# Patient Record
Sex: MALE | Race: WHITE
[De-identification: names, ages, dates, MRNs, and addresses within clinical notes are randomized per-mention and may not be internally consistent; named-entity substitution may affect disease eponyms.]

---

## 2018-03-09 ENCOUNTER — HOSPITAL ENCOUNTER (OUTPATIENT)
Dept: HOSPITAL 92 - BICULT | Age: 58
Discharge: HOME | End: 2018-03-09
Attending: FAMILY MEDICINE
Payer: COMMERCIAL

## 2018-03-09 DIAGNOSIS — N26.1: ICD-10-CM

## 2018-03-09 DIAGNOSIS — N28.1: ICD-10-CM

## 2018-03-09 DIAGNOSIS — R31.29: Primary | ICD-10-CM

## 2018-03-09 PROCEDURE — 76770 US EXAM ABDO BACK WALL COMP: CPT

## 2018-12-06 ENCOUNTER — HOSPITAL ENCOUNTER (INPATIENT)
Dept: HOSPITAL 92 - SJJU | Age: 58
LOS: 15 days | Discharge: HOME | DRG: 467 | End: 2018-12-21
Attending: ORTHOPAEDIC SURGERY | Admitting: ORTHOPAEDIC SURGERY
Payer: COMMERCIAL

## 2018-12-06 ENCOUNTER — HOSPITAL ENCOUNTER (OUTPATIENT)
Dept: HOSPITAL 92 - LABBT | Age: 58
Discharge: HOME | End: 2018-12-06
Attending: ORTHOPAEDIC SURGERY
Payer: COMMERCIAL

## 2018-12-06 VITALS — BODY MASS INDEX: 26.4 KG/M2

## 2018-12-06 DIAGNOSIS — E78.5: ICD-10-CM

## 2018-12-06 DIAGNOSIS — T84.011A: Primary | ICD-10-CM

## 2018-12-06 DIAGNOSIS — Z88.6: ICD-10-CM

## 2018-12-06 DIAGNOSIS — Z01.818: Primary | ICD-10-CM

## 2018-12-06 DIAGNOSIS — Z88.0: ICD-10-CM

## 2018-12-06 DIAGNOSIS — T84.018A: ICD-10-CM

## 2018-12-06 DIAGNOSIS — D62: ICD-10-CM

## 2018-12-06 DIAGNOSIS — F17.210: ICD-10-CM

## 2018-12-06 DIAGNOSIS — I10: ICD-10-CM

## 2018-12-06 DIAGNOSIS — K21.0: ICD-10-CM

## 2018-12-06 DIAGNOSIS — Z96.643: ICD-10-CM

## 2018-12-06 DIAGNOSIS — D64.9: ICD-10-CM

## 2018-12-06 DIAGNOSIS — M17.12: ICD-10-CM

## 2018-12-06 DIAGNOSIS — Z79.82: ICD-10-CM

## 2018-12-06 DIAGNOSIS — R50.82: ICD-10-CM

## 2018-12-06 LAB
ANION GAP SERPL CALC-SCNC: 11 MMOL/L (ref 10–20)
BASOPHILS # BLD AUTO: 0.1 THOU/UL (ref 0–0.2)
BASOPHILS NFR BLD AUTO: 1.1 % (ref 0–1)
BUN SERPL-MCNC: 24 MG/DL (ref 8.4–25.7)
CALCIUM SERPL-MCNC: 9.9 MG/DL (ref 7.8–10.44)
CHLORIDE SERPL-SCNC: 108 MMOL/L (ref 98–107)
CO2 SERPL-SCNC: 26 MMOL/L (ref 22–29)
CREAT CL PREDICTED SERPL C-G-VRATE: 0 ML/MIN (ref 70–130)
CRYSTAL-AUWI FLAG: 0 (ref 0–15)
EOSINOPHIL # BLD AUTO: 0.4 THOU/UL (ref 0–0.7)
EOSINOPHIL NFR BLD AUTO: 7.2 % (ref 0–10)
GLUCOSE SERPL-MCNC: 107 MG/DL (ref 70–105)
HEV IGM SER QL: 0.8 (ref 0–7.99)
HGB BLD-MCNC: 12.7 G/DL (ref 14–18)
HYALINE CASTS #/AREA URNS LPF: (no result) LPF
INR PPP: 1
LYMPHOCYTES # BLD: 1.6 THOU/UL (ref 1.2–3.4)
LYMPHOCYTES NFR BLD AUTO: 28.8 % (ref 21–51)
MCH RBC QN AUTO: 29.4 PG (ref 27–31)
MCV RBC AUTO: 88.1 FL (ref 78–98)
MONOCYTES # BLD AUTO: 0.6 THOU/UL (ref 0.11–0.59)
MONOCYTES NFR BLD AUTO: 9.7 % (ref 0–10)
NEUTROPHILS # BLD AUTO: 3 THOU/UL (ref 1.4–6.5)
NEUTROPHILS NFR BLD AUTO: 53.2 % (ref 42–75)
PATHC CAST-AUWI FLAG: 0.14 (ref 0–2.49)
PLATELET # BLD AUTO: 288 THOU/UL (ref 130–400)
POTASSIUM SERPL-SCNC: 3.8 MMOL/L (ref 3.5–5.1)
PROTHROMBIN TIME: 12.9 SEC (ref 12–14.7)
RBC # BLD AUTO: 4.32 MILL/UL (ref 4.7–6.1)
RBC UR QL AUTO: (no result) HPF (ref 0–3)
SODIUM SERPL-SCNC: 141 MMOL/L (ref 136–145)
SP GR UR STRIP: 1.02 (ref 1–1.04)
SPERM-AUWI FLAG: 0 (ref 0–9.9)
WBC # BLD AUTO: 5.7 THOU/UL (ref 4.8–10.8)
WBC UR QL AUTO: (no result) HPF (ref 0–3)
YEAST-AUWI FLAG: 0 (ref 0–25)

## 2018-12-06 PROCEDURE — 85610 PROTHROMBIN TIME: CPT

## 2018-12-06 PROCEDURE — C1776 JOINT DEVICE (IMPLANTABLE): HCPCS

## 2018-12-06 PROCEDURE — 81001 URINALYSIS AUTO W/SCOPE: CPT

## 2018-12-06 PROCEDURE — 85027 COMPLETE CBC AUTOMATED: CPT

## 2018-12-06 PROCEDURE — 93005 ELECTROCARDIOGRAM TRACING: CPT

## 2018-12-06 PROCEDURE — 87081 CULTURE SCREEN ONLY: CPT

## 2018-12-06 PROCEDURE — 85025 COMPLETE CBC W/AUTO DIFF WBC: CPT

## 2018-12-06 PROCEDURE — 80048 BASIC METABOLIC PNL TOTAL CA: CPT

## 2018-12-06 PROCEDURE — 36415 COLL VENOUS BLD VENIPUNCTURE: CPT

## 2018-12-06 PROCEDURE — 93010 ELECTROCARDIOGRAM REPORT: CPT

## 2018-12-07 NOTE — EKG
Test Reason : 

Blood Pressure : ***/*** mmHG

Vent. Rate : 062 BPM     Atrial Rate : 062 BPM

   P-R Int : 238 ms          QRS Dur : 094 ms

    QT Int : 422 ms       P-R-T Axes : 032 -10 022 degrees

   QTc Int : 428 ms

 

Sinus rhythm with 1st degree A-V block with occasional Premature ventricular complexes and Possible P
remature atrial complexes with Abberant

conduction

Minimal voltage criteria for LVH, may be normal variant

Borderline ECG

When compared with ECG of 11-OCT-2016 14:15,

Premature ventricular complexes are now Present

Abberant conduction is now Present

Confirmed by EMMA HENDERSON ([id number]) on 12/7/2018 6:50:02 PM

 

Referred By:  JOSE C           Confirmed By:EMMA HENDERSON

## 2018-12-14 ENCOUNTER — HOSPITAL ENCOUNTER (OUTPATIENT)
Dept: HOSPITAL 92 - LABBT | Age: 58
Discharge: HOME | End: 2018-12-14
Attending: ORTHOPAEDIC SURGERY
Payer: COMMERCIAL

## 2018-12-14 DIAGNOSIS — Z01.818: Primary | ICD-10-CM

## 2018-12-14 DIAGNOSIS — I70.0: ICD-10-CM

## 2018-12-14 DIAGNOSIS — T84.018A: ICD-10-CM

## 2018-12-14 PROCEDURE — 71046 X-RAY EXAM CHEST 2 VIEWS: CPT

## 2018-12-14 PROCEDURE — 86901 BLOOD TYPING SEROLOGIC RH(D): CPT

## 2018-12-14 PROCEDURE — 86900 BLOOD TYPING SEROLOGIC ABO: CPT

## 2018-12-14 PROCEDURE — 86850 RBC ANTIBODY SCREEN: CPT

## 2018-12-14 NOTE — RAD
CHEST PA AND LATERAL:

 

History: 

58-year-old male for preoperative evaluation. 

 

Comparison: 

2-19-18

 

FINDINGS: 

Heart size is within normal limits. No confluent pneumonia, overt edema, or pleural effusion. 

 

IMPRESSION: 

No acute intrathoracic disease. Atherosclerosis of the aorta with tortuosity and calcific changes. 

 

POS: [facility]UAN [facility]SE

## 2018-12-18 PROCEDURE — 0SRB04A REPLACEMENT OF LEFT HIP JOINT WITH CERAMIC ON POLYETHYLENE SYNTHETIC SUBSTITUTE, UNCEMENTED, OPEN APPROACH: ICD-10-PCS | Performed by: ORTHOPAEDIC SURGERY

## 2018-12-18 PROCEDURE — 0SPB0JZ REMOVAL OF SYNTHETIC SUBSTITUTE FROM LEFT HIP JOINT, OPEN APPROACH: ICD-10-PCS | Performed by: ORTHOPAEDIC SURGERY

## 2018-12-18 RX ADMIN — ASPIRIN SCH MG: 81 TABLET ORAL at 20:06

## 2018-12-18 RX ADMIN — DOCUSATE SODIUM 50 MG AND SENNOSIDES 8.6 MG SCH TAB: 8.6; 5 TABLET, FILM COATED ORAL at 20:06

## 2018-12-18 RX ADMIN — Medication PRN ML: at 20:05

## 2018-12-18 RX ADMIN — Medication PRN ML: at 21:22

## 2018-12-18 RX ADMIN — HYDROCODONE BITARTRATE AND ACETAMINOPHEN PRN TAB: 5; 325 TABLET ORAL at 16:02

## 2018-12-18 RX ADMIN — Medication SCH MLS: at 23:30

## 2018-12-18 NOTE — RAD
LEFT HIP THREE VIEWS:

 

INDICATIONS:

Postop left hip.

 

COMPARISON:

Prior exam dated 03/05/2013.

 

FINDINGS:

Since the comparison examination, there has been revision of the femoral prosthetic component of the 
left total hip arthroplasty.  There are cerclage bands surrounding the proximal trochanteric portion 
and proximal femoral stent portions of the femoral prosthesis.  The acetabular cup has also been revi
sed and is now a Press-fit cup.  There is soft tissue gas within the periarticular soft tissues.

 

IMPRESSION:

Revision of left total hip arthroplasty.

 

POS: JUAN JOSE

## 2018-12-18 NOTE — OP
DATE OF PROCEDURE:  12/18/2018



PREOPERATIVE DIAGNOSIS:  Failed left total hip replacement.



POSTOPERATIVE DIAGNOSIS:  Failed left total hip replacement.



PROCEDURE PERFORMED:  Left total hip arthroplasty revision.



ASSISTANT:  Rajesh Rehman PA-C



BLOOD LOSS:  500.



SPECIMENS:  None.



DRAINS:  None.



COMPLICATIONS:  None.



IMPLANTS USED:  A 155 x 16 Restoration modular stem with a +20 body and -2.7 ceramic

head with a MDM head and a size 56 acetabulum with MDM liner. 



DESCRIPTION OF PROCEDURE:  The patient was taken to the operating room, where

general anesthesia was induced.  He was placed in a right lateral decubitus

position.  His left leg was prepped in sterile fashion.  He had multiple scars in

his leg that shows most appropriate scar for the incision, which was more anterior

one.  This was carried down.  The IT band was divided distally and extended

proximally.  Self-retaining retractors was placed in the wound, taking down the

anterior one-third of the abductor mechanism.  Capsular tissue was excised.  The hip

was dislocated.  Stem, although had a fluid wave, but was not super well fixed,

could not be extracted using flexible osteotomes and stem extractor devices.

Therefore, I performed extended trochanteric osteotomy.  This facilitated removal of

the stem.  I then repaired the osteotomy and also placed a prophylactic cable around

the intact part of the femur to ascertain the acetabulum.  I removed the acetabular

liner.  I removed the screws and then used an Innomed cup extractor to remove the

acetabulum.  There was some bony erosion on the femur and around particularly the

inferior aspect of the acetabulum.  I reamed up to a size 57, impacted a 58 cup.  We

got good purchase and __________ screw fixation.  MDM liner was deployed.  Attention

was turned back to the femur.  I placed a +10 head and neck replacement body and

this gave a great leg length.  However, his hip was unstable and external rotation

in neutral position.  I dislocated the hip.  I made sure all scar tissue was removed

from the posterior aspect of the acetabulum.  I trimmed the posterior aspect of the

greater trochanter as much as possible.  There was really no significant osteophytes

on the pelvis.  Still the hip was unstable, so I had to go to a +20 body and this

gave very good stability.  However, his leg length was off.  I used a -2.7 ceramic

head, as this was the shortest head I could get for this implant.  The ceramic head

was impacted in place.  The hip was reduced.  Fixed the trochanter to the head and

neck replacement body with Dall-Miles cables.  Irrigation was performed.  Abductors

were repaired with #2 Vicryl.  IT band was repaired with #2 Vicryl and #2 Quill,

subcu closed with 0 Quill, and skin was closed with 2-0 Monoderm.  Skin glue was

applied and sterile dressing was applied. 







Job ID:  [id number]

## 2018-12-18 NOTE — PDOC.PN
- Subjective


Encounter Start Date: 12/18/18


Encounter Start Time: 14:30


Subjective: no sob or chest pain


-: is post op, alert and oriented


-: OT is here to work with him





- Objective


MAR Reviewed: Yes


Vital Signs & Weight: 


 Weight











Weight                         200 lb

















Phys Exam





- Physical Examination


HEENT: PERRLA, moist MMs


Neck: no JVD, supple


Respiratory: no wheezing, no rales


Cardiovascular: RRR, no significant murmur


Gastrointestinal: soft, non-tender, positive bowel sounds


Musculoskeletal: no edema, pulses present


left hip lat area dressing is clean


Neurological: non-focal, moves all 4 limbs


Psychiatric: normal affect, A&O x 3





Dx/Plan


(1) s/p left hip total hip arthroplasty


Status: Acute   





(2) GERD (gastroesophageal reflux disease)


Code(s): K21.9 - GASTRO-ESOPHAGEAL REFLUX DISEASE WITHOUT ESOPHAGITIS   Status: 

Chronic   


Qualifiers: 


   Esophagitis presence: with esophagitis   Qualified Code(s): K21.0 - Gastro-

esophageal reflux disease with esophagitis   





(3) Dyslipidemia


Code(s): E78.5 - HYPERLIPIDEMIA, UNSPECIFIED   Status: Chronic   





(4) HTN (hypertension)


Code(s): I10 - ESSENTIAL (PRIMARY) HYPERTENSION   Status: Chronic   


Qualifiers: 


   Hypertension type: essential hypertension   Qualified Code(s): I10 - 

Essential (primary) hypertension   





- Plan


post op recovering well


-: continue tricor, norvasc and losartan


-: hold hctz for now


-: on asp bid, marcaine nr block, fentanyl prn


-: will f/u





* .








Review of Systems





- Medications/Allergies


Allergies/Adverse Reactions: 


 Allergies











Allergy/AdvReac Type Severity Reaction Status Date / Time


 


NSAIDS (Non-Steroidal Allergy  stomach Verified 12/18/18 12:58





Anti-Inflamma   ulcers  


 


Penicillins Allergy  Hives Verified 12/18/18 12:58











Medications: 


 Current Medications





Acetaminophen (Tylenol)  650 mg PO Q4H PRN


   PRN Reason: Headache/Fever or Pain


Hydrocodone Bitart/Acetaminophen (Norco 5/325)  1 tab PO Q4H PRN


   PRN Reason: Mild  Pain 0-3


Hydrocodone Bitart/Acetaminophen (Norco 5/325)  2 tab PO Q4H PRN


   PRN Reason: For Moderate  Pain 4-6


Hydrocodone Bitart/Acetaminophen (Norco 10/325)  1 tab PO Q4H PRN


   PRN Reason: Moderate Pain (4-6)


Hydrocodone Bitart/Acetaminophen (Norco 10/325)  2 tab PO Q4H PRN


   PRN Reason: Severe Pain (7-10)


Amlodipine Besylate (Norvasc)  5 mg PO QAM [facility]


Aspirin (Ecotrin)  81 mg PO BID [facility]


Bupivacaine HCl (Marcaine)  5 ml EPIDURAL ONE PRN


   PRN Reason: UNCONTROLLED PAIN


   Stop: 12/18/18 23:00


Diphenhydramine HCl (Benadryl)  25 mg IM Q3H PRN


   PRN Reason: Itching


Diphenhydramine HCl (Benadryl)  25 mg IVP Q3H PRN


   PRN Reason: Itching


Diphenhydramine HCl (Benadryl)  25 mg PO Q6H PRN


   PRN Reason: Itching


Emollient Cream (Hydrocerin Cream)  0 gm TOP PRN PRN


   PRN Reason: Itching


Fenofibrate (Tricor)  145 mg PO HS [facility]


Fentanyl (Sublimaze)  50 mcg SLOW IVP Q30MIN PRN


   PRN Reason: Moderate Pain (4-6)


Fentanyl (Sublimaze)  100 mcg SLOW IVP Q1H PRN


   PRN Reason: Severe Pain (7-10)


Fentanyl (Duragesic)  100 mcg TD Q2D [facility]


Ferrous Gluconate (Fergon)  324 mg PO BID [facility]


HCTZ/Losartan Potassium (Hyzaar 100/25)  1 tab PO QAM [facility]


Fentanyl Citrate (Fentanyl/Bupivacaine)  100 mls @ 0 mls/hr EPIDURAL INF [facility]


Levofloxacin 500 mg/ Device  100 mls @ 100 mls/hr IVPB Q24H [facility]


   Stop: 12/18/18 14:59


Sodium Chloride (Normal Saline 0.9%)  1,000 mls @ 100 mls/hr IV .Q10H [facility]


Vancomycin HCl 1.5 gm/ Sodium (Chloride)  300 mls @ 200 mls/hr IVPB ONE [facility]


   Stop: 12/18/18 21:29


Iron/Minerals/Multivitamins (Theragran M)  1 tab PO DAILY [facility]


Loratadine (Claritin)  10 mg PO DAILY [facility]


Miscellaneous Information (Communication Order-Pharmacy)  1 each FS ASDIR [facility]


Naloxone HCl (Narcan)  0.2 mg IV Q5MIN PRN


   PRN Reason: RR <=8 OR OBTUNDED/UNAROUSABLE


Naloxone HCl (Narcan)  0.1 mg IVP Q15MIN PRN


   PRN Reason: URINARY RETENTION


Ondansetron HCl (Zofran)  4 mg IVP Q6H PRN


   PRN Reason: Nausea/Vomiting


Pantoprazole Sodium (Protonix)  40 mg PO QAM [facility]


[Testosterone] 1 (Pump)  0 each TOP DAILY [facility]


Promethazine HCl (Phenergan)  12.5 mg IM Q4H PRN


   PRN Reason: Nausea


Promethazine HCl (Phenergan Suppository)  25 mg [place] Q4H PRN


   PRN Reason: Nausea/Vomiting


Senna/Docusate Sodium (Senokot S)  2 tab PO BID [facility]


Sodium Chloride (Flush - Normal Saline)  10 ml IVF PRN PRN


   PRN Reason: Saline Flush


Tramadol HCl (Ultram)  50 mg PO Q6H PRN


   PRN Reason: Mild Pain 1-3


Tramadol HCl (Ultram)  100 mg PO Q6H PRN


   PRN Reason: Moderate Pain 4-6


Zolpidem Tartrate (Ambien)  5 mg PO HSPRN PRN


   PRN Reason: Insomnia

## 2018-12-19 LAB
HGB BLD-MCNC: 9.7 G/DL (ref 14–18)
MCH RBC QN AUTO: 28.9 PG (ref 27–31)
MCV RBC AUTO: 87.1 FL (ref 78–98)
PLATELET # BLD AUTO: 211 THOU/UL (ref 130–400)
RBC # BLD AUTO: 3.36 MILL/UL (ref 4.7–6.1)
WBC # BLD AUTO: 6.8 THOU/UL (ref 4.8–10.8)

## 2018-12-19 RX ADMIN — ASPIRIN SCH MG: 81 TABLET ORAL at 20:37

## 2018-12-19 RX ADMIN — HYDROCODONE BITARTRATE AND ACETAMINOPHEN PRN TAB: 5; 325 TABLET ORAL at 18:13

## 2018-12-19 RX ADMIN — ASPIRIN SCH MG: 81 TABLET ORAL at 08:18

## 2018-12-19 RX ADMIN — MULTIPLE VITAMINS W/ MINERALS TAB SCH: TAB at 08:21

## 2018-12-19 RX ADMIN — Medication SCH MLS: at 04:04

## 2018-12-19 RX ADMIN — Medication SCH MLS: at 12:31

## 2018-12-19 RX ADMIN — Medication SCH MLS: at 22:58

## 2018-12-19 RX ADMIN — DOCUSATE SODIUM 50 MG AND SENNOSIDES 8.6 MG SCH TAB: 8.6; 5 TABLET, FILM COATED ORAL at 20:37

## 2018-12-19 RX ADMIN — HYDROCODONE BITARTRATE AND ACETAMINOPHEN PRN TAB: 5; 325 TABLET ORAL at 12:30

## 2018-12-19 RX ADMIN — HYDROCODONE BITARTRATE AND ACETAMINOPHEN PRN TAB: 5; 325 TABLET ORAL at 08:07

## 2018-12-19 RX ADMIN — PANTOPRAZOLE SODIUM SCH: 40 GRANULE, DELAYED RELEASE ORAL at 12:44

## 2018-12-19 RX ADMIN — HYDROCODONE BITARTRATE AND ACETAMINOPHEN PRN TAB: 5; 325 TABLET ORAL at 22:10

## 2018-12-19 RX ADMIN — DOCUSATE SODIUM 50 MG AND SENNOSIDES 8.6 MG SCH: 8.6; 5 TABLET, FILM COATED ORAL at 08:20

## 2018-12-19 RX ADMIN — LOSARTAN POTASSIUM AND HYDROCHLOROTHIAZIDE SCH: 100; 25 TABLET, FILM COATED ORAL at 11:29

## 2018-12-19 NOTE — PDOC.PN
- Subjective


Encounter Start Date: 12/19/18


Encounter Start Time: 08:20


-: old records requested/rev





Patient seen and examined.  No overnight events





he has burning discomfort at surgical site, he has fever





- Objective


Resuscitation Status - Order Detail:





12/19/18 07:38


Resuscitation Status Routine 


   Resuscitation Status: FULL: Full Resuscitation








MAR Reviewed: Yes


Vital Signs & Weight: 


 Vital Signs (12 hours)











  Temp Pulse Resp BP Pulse Ox


 


 12/19/18 08:32  99.6 F  86  18  112/67  93 L


 


 12/19/18 08:18   91   


 


 12/19/18 04:15  99.8 F H    


 


 12/19/18 03:58  100.1 F H  91  22 H  112/68  94 L


 


 12/19/18 00:27  100.5 F H  94  20  121/76  95








 Weight











Weight                         200 lb














I&O: 


 











 12/18/18 12/19/18 12/20/18





 06:59 06:59 06:59


 


Intake Total  3450 


 


Output Total  3500 


 


Balance  -50 











Result Diagrams: 


 12/19/18 05:23





Radiology Reviewed by me: Yes (Hip xray reviewed)





Phys Exam





- Physical Examination


Constitutional: NAD


HEENT: PERRLA, moist MMs, sclera anicteric


Neck: no JVD, supple


Respiratory: no wheezing, no rales, no rhonchi


Cardiovascular: RRR, no significant murmur, no rub


Gastrointestinal: soft, non-tender, no distention, positive bowel sounds


Musculoskeletal: no edema, pulses present


surgical site with dressing, epidural in place


webber+


Neurological: non-focal, normal sensation, moves all 4 limbs


Lymphatic: no nodes


Psychiatric: normal affect, A&O x 3


Skin: no rash, normal turgor





Dx/Plan


(1) Anemia, normocytic normochromic


Code(s): D64.9 - ANEMIA, UNSPECIFIED   Status: Acute   Comment: post operative 

blood loss   





(2) Fever


Code(s): R50.9 - FEVER, UNSPECIFIED   Status: Acute   





(3) Status post total hip replacement, left


Code(s): Z96.642 - PRESENCE OF LEFT ARTIFICIAL HIP JOINT   Status: Acute   





(4) Dyslipidemia


Code(s): E78.5 - HYPERLIPIDEMIA, UNSPECIFIED   Status: Chronic   





(5) GERD (gastroesophageal reflux disease)


Code(s): K21.9 - GASTRO-ESOPHAGEAL REFLUX DISEASE WITHOUT ESOPHAGITIS   Status: 

Chronic   


Qualifiers: 


   Esophagitis presence: with esophagitis   Qualified Code(s): K21.0 - Gastro-

esophageal reflux disease with esophagitis   





(6) HTN (hypertension)


Code(s): I10 - ESSENTIAL (PRIMARY) HYPERTENSION   Status: Chronic   


Qualifiers: 


   Hypertension type: essential hypertension   Qualified Code(s): I10 - 

Essential (primary) hypertension   





(7) Tobacco abuse


Code(s): Z72.0 - TOBACCO USE   Status: Chronic   





- Plan


cont current plan of care, PT/OT





* continue aspirin for DVT prophylaxis


* continue PT as per [facility] treatment


* epidural as per anesthesia


* discharge per primary team


* medically stable


* medication reviewed as below


* symptomatic treatment


* pain controlled


* code status addressed and he is full code..


* home medication reconciled


* hold BP meds for SBP <120


* if recurrent fever, then will check culture 








Review of Systems





- Review of Systems


Constitutional: fever.  negative: chills, sweats, weakness, malaise, other


ENT: negative: Ear Pain, Ear Discharge, Nose Pain, Nose Discharge, Nose 

Congestion, Mouth Pain, Mouth Swelling, Throat Pain, Throat Swelling, Other


Respiratory: negative: Cough, Dry, Shortness of Breath, Hemoptysis, SOB with 

Excertion, Pleuritic Pain, Sputum, Wheezing


Cardiovascular: negative: chest pain, palpitations, orthopnea, paroxysmal 

nocturnal dyspnea, edema, light headedness, other


Gastrointestinal: negative: Nausea, Vomiting, Abdominal Pain, Diarrhea, 

Constipation, Melena, Hematochezia, Other


Genitourinary: negative: Dysuria, Frequency, Incontinence, Hematuria, Retention

, Other


Musculoskeletal: negative: Neck Pain, Shoulder Pain, Arm Pain, Back Pain, Hand 

Pain, Leg Pain, Foot Pain, Other


Skin: negative: Rash, Lesions, Vito, Bruising, Other





- Medications/Allergies


Allergies/Adverse Reactions: 


 Allergies











Allergy/AdvReac Type Severity Reaction Status Date / Time


 


NSAIDS (Non-Steroidal Allergy  stomach Verified 12/18/18 12:58





Anti-Inflamma   ulcers  


 


Penicillins Allergy  Hives Verified 12/18/18 12:58











Medications: 


 Current Medications





Acetaminophen (Tylenol)  650 mg PO Q4H PRN


   PRN Reason: Headache/Fever or Pain


   Last Admin: 12/18/18 22:52 Dose:  650 mg


Hydrocodone Bitart/Acetaminophen (Norco 5/325)  1 tab PO Q4H PRN


   PRN Reason: Mild  Pain 0-3


Hydrocodone Bitart/Acetaminophen (Norco 5/325)  2 tab PO Q4H PRN


   PRN Reason: For Moderate  Pain 4-6


   Last Admin: 12/19/18 08:07 Dose:  2 tab


Amlodipine Besylate (Norvasc)  5 mg PO QAM [facility]


   Last Admin: 12/19/18 08:18 Dose:  5 mg


Artificial Tears (Tears Naturale)  2 drop EA EYE PRN PRN


   PRN Reason: Dry Eyes


Aspirin (Ecotrin)  81 mg PO BID [facility]


   Last Admin: 12/19/18 08:18 Dose:  81 mg


Diphenhydramine HCl (Benadryl)  25 mg IM Q3H PRN


   PRN Reason: Itching


Diphenhydramine HCl (Benadryl)  25 mg IVP Q3H PRN


   PRN Reason: Itching


Diphenhydramine HCl (Benadryl)  25 mg PO Q6H PRN


   PRN Reason: Itching


Fenofibrate (Tricor)  145 mg PO HS [facility]


   Last Admin: 12/18/18 21:25 Dose:  Not Given


Fentanyl (Duragesic)  100 mcg TD Q3D [facility]


   Last Admin: 12/19/18 06:53 Dose:  100 mcg


Ferrous Gluconate (Fergon)  324 mg PO BID [facility]


   Last Admin: 12/19/18 08:21 Dose:  Not Given


Guaifenesin (Robitussin Sf)  200 mg PO Q4H PRN


   PRN Reason: Cough


HCTZ/Losartan Potassium (Hyzaar 100/25)  [facility]


Hydralazine HCl (Apresoline)  10 mg SLOW IVP Q4H PRN


   PRN Reason: SBP > 180 and HR < 70


Fentanyl Citrate (Fentanyl/Bupivacaine)  100 mls @ 8 mls/hr EPIDURAL INF [facility]


   Last Admin: 12/18/18 23:30 Dose:  100 mls


Sodium Chloride (Normal Saline 0.9%)  1,000 mls @ 100 mls/hr IV .Q10H [facility]


   Last Admin: 12/18/18 23:42 Dose:  1,000 mls


Iron/Minerals/Multivitamins (Theragran M)  1 tab PO DAILY [facility]


   Last Admin: 12/19/18 08:21 Dose:  Not Given


Loperamide HCl (Imodium)  2 mg PO PRN PRN


   PRN Reason: Diarrhea/Loose Stools


Loratadine (Claritin)  10 mg PO DAILY [facility]


Mineral Oil/White Petrolatum (Eucerin Cream)  0 gm TOP BIDPRN PRN


   PRN Reason: Dry Skin


Miscellaneous Information (Communication Order-Pharmacy)  1 each FS ASDIR [facility]


Naloxone HCl (Narcan)  0.2 mg IV Q5MIN PRN


   PRN Reason: RR <=8 OR OBTUNDED/UNAROUSABLE


Naloxone HCl (Narcan)  0.1 mg IVP Q15MIN PRN


   PRN Reason: URINARY RETENTION


Ondansetron HCl (Zofran)  4 mg IVP Q6H PRN


   PRN Reason: Nausea/Vomiting


   Last Admin: 12/18/18 21:21 Dose:  4 mg


Pantoprazole Sodium (Protonix)  40 mg PO QAM [facility]


Promethazine HCl (Phenergan)  12.5 mg IM Q4H PRN


   PRN Reason: Nausea


   Last Admin: 12/18/18 23:40 Dose:  12.5 mg


Promethazine HCl (Phenergan Suppository)  25 mg [place] Q4H PRN


   PRN Reason: Nausea/Vomiting


Senna/Docusate Sodium (Senokot S)  2 tab PO BID [facility]


   Last Admin: 12/19/18 08:20 Dose:  Not Given


Sodium Chloride (Flush - Normal Saline)  10 ml IVF PRN PRN


   PRN Reason: Saline Flush


   Last Admin: 12/18/18 21:22 Dose:  10 ml


Throat Lozenges (Cepastat Lozenges)  1 haris PO Q2H PRN


   PRN Reason: Sore Throat


Tramadol HCl (Ultram)  50 mg PO Q6H PRN


   PRN Reason: Mild Pain 1-3


Tramadol HCl (Ultram)  100 mg PO Q6H PRN


   PRN Reason: Moderate Pain 4-6


   Last Admin: 12/19/18 06:11 Dose:  100 mg


Zolpidem Tartrate (Ambien)  5 mg PO HSPRN PRN


   PRN Reason: Insomnia

## 2018-12-20 LAB
HGB BLD-MCNC: 9.5 G/DL (ref 14–18)
MCH RBC QN AUTO: 28.5 PG (ref 27–31)
MCV RBC AUTO: 89.5 FL (ref 78–98)
PLATELET # BLD AUTO: 218 THOU/UL (ref 130–400)
RBC # BLD AUTO: 3.33 MILL/UL (ref 4.7–6.1)
WBC # BLD AUTO: 8.1 THOU/UL (ref 4.8–10.8)

## 2018-12-20 RX ADMIN — Medication SCH MLS: at 23:09

## 2018-12-20 RX ADMIN — HYDROCODONE BITARTRATE AND ACETAMINOPHEN PRN TAB: 5; 325 TABLET ORAL at 02:13

## 2018-12-20 RX ADMIN — DOCUSATE SODIUM 50 MG AND SENNOSIDES 8.6 MG SCH TAB: 8.6; 5 TABLET, FILM COATED ORAL at 21:19

## 2018-12-20 RX ADMIN — Medication SCH MLS: at 08:58

## 2018-12-20 RX ADMIN — HYDROCODONE BITARTRATE AND ACETAMINOPHEN PRN TAB: 5; 325 TABLET ORAL at 06:25

## 2018-12-20 RX ADMIN — LOSARTAN POTASSIUM AND HYDROCHLOROTHIAZIDE SCH TAB: 100; 25 TABLET, FILM COATED ORAL at 09:14

## 2018-12-20 RX ADMIN — HYDROCODONE BITARTRATE AND ACETAMINOPHEN PRN TAB: 10; 325 TABLET ORAL at 15:02

## 2018-12-20 RX ADMIN — HYDROCODONE BITARTRATE AND ACETAMINOPHEN PRN TAB: 10; 325 TABLET ORAL at 23:26

## 2018-12-20 RX ADMIN — HYDROCODONE BITARTRATE AND ACETAMINOPHEN PRN TAB: 10; 325 TABLET ORAL at 19:22

## 2018-12-20 RX ADMIN — HYDROCODONE BITARTRATE AND ACETAMINOPHEN PRN TAB: 5; 325 TABLET ORAL at 10:17

## 2018-12-20 RX ADMIN — MULTIPLE VITAMINS W/ MINERALS TAB SCH TAB: TAB at 09:14

## 2018-12-20 RX ADMIN — DOCUSATE SODIUM 50 MG AND SENNOSIDES 8.6 MG SCH TAB: 8.6; 5 TABLET, FILM COATED ORAL at 09:15

## 2018-12-20 RX ADMIN — ASPIRIN SCH MG: 81 TABLET ORAL at 09:14

## 2018-12-20 RX ADMIN — PANTOPRAZOLE SODIUM SCH MG: 40 GRANULE, DELAYED RELEASE ORAL at 09:17

## 2018-12-20 RX ADMIN — ASPIRIN SCH MG: 81 TABLET ORAL at 21:20

## 2018-12-20 NOTE — PDOC.PN
- Subjective


Encounter Start Date: 12/20/18


Encounter Start Time: 09:00





his fever is tending down, sore throat improving, 


Patient seen and examined. No new complaints. No overnight events





- Objective


Resuscitation Status - Order Detail:





12/19/18 07:38


Resuscitation Status Routine 


   Resuscitation Status: FULL: Full Resuscitation








MAR Reviewed: Yes


Vital Signs & Weight: 


 Vital Signs (12 hours)











  Temp Pulse Resp BP Pulse Ox


 


 12/20/18 10:41  99.7 F H  88  18  113/67  92 L


 


 12/20/18 09:14   86   


 


 12/20/18 07:34  99.6 F  86  18  123/75  93 L


 


 12/20/18 07:33      95


 


 12/20/18 04:00  99.3 F  83  18  121/82  94 L


 


 12/20/18 00:00  100.4 F H  93  18  121/77  95








 Weight











Weight                         200 lb














I&O: 


 











 12/19/18 12/20/18 12/21/18





 06:59 06:59 06:59


 


Intake Total 3450 2380 


 


Output Total 3500 1500 


 


Balance -50 880 











Result Diagrams: 


 12/20/18 07:34








Phys Exam





- Physical Examination


Constitutional: NAD


HEENT: PERRLA, moist MMs, sclera anicteric


Neck: no JVD, supple


Respiratory: no wheezing, no rales, no rhonchi


Cardiovascular: RRR, no significant murmur, no rub


Gastrointestinal: soft, non-tender, no distention, positive bowel sounds


Musculoskeletal: no edema, pulses present


epidural and webber in place


Neurological: non-focal, normal sensation, moves all 4 limbs


Lymphatic: no nodes


Psychiatric: normal affect, A&O x 3


Skin: no rash, normal turgor





Dx/Plan


(1) Anemia, normocytic normochromic


Code(s): D64.9 - ANEMIA, UNSPECIFIED   Status: Acute   Comment: post operative 

blood loss   





(2) Fever


Code(s): R50.9 - FEVER, UNSPECIFIED   Status: Acute   





(3) Status post total hip replacement, left


Code(s): Z96.642 - PRESENCE OF LEFT ARTIFICIAL HIP JOINT   Status: Acute   





(4) Dyslipidemia


Code(s): E78.5 - HYPERLIPIDEMIA, UNSPECIFIED   Status: Chronic   





(5) GERD (gastroesophageal reflux disease)


Code(s): K21.9 - GASTRO-ESOPHAGEAL REFLUX DISEASE WITHOUT ESOPHAGITIS   Status: 

Chronic   


Qualifiers: 


   Esophagitis presence: with esophagitis   Qualified Code(s): K21.0 - Gastro-

esophageal reflux disease with esophagitis   





(6) HTN (hypertension)


Code(s): I10 - ESSENTIAL (PRIMARY) HYPERTENSION   Status: Chronic   


Qualifiers: 


   Hypertension type: essential hypertension   Qualified Code(s): I10 - 

Essential (primary) hypertension   





(7) Tobacco abuse


Code(s): Z72.0 - TOBACCO USE   Status: Chronic   





- Plan


cont current plan of care, plan discussed w/ family, PT/OT





* he does not have any clinical reason for his fever, suspecting reactive fever


* medication reviewed as below


* symptomatic treatment


* discussed with wife


* stable medically


* pain controlled.








Review of Systems





- Review of Systems


ENT: negative: Ear Pain, Ear Discharge, Nose Pain, Nose Discharge, Nose 

Congestion, Mouth Pain, Mouth Swelling, Throat Pain, Throat Swelling, Other


Respiratory: negative: Cough, Dry, Shortness of Breath, Hemoptysis, SOB with 

Excertion, Pleuritic Pain, Sputum, Wheezing


Cardiovascular: negative: chest pain, palpitations, orthopnea, paroxysmal 

nocturnal dyspnea, edema, light headedness, other


Gastrointestinal: negative: Nausea, Vomiting, Abdominal Pain, Diarrhea, 

Constipation, Melena, Hematochezia, Other


Genitourinary: negative: Dysuria, Frequency, Incontinence, Hematuria, Retention

, Other


Musculoskeletal: negative: Neck Pain, Shoulder Pain, Arm Pain, Back Pain, Hand 

Pain, Leg Pain, Foot Pain, Other


Skin: negative: Rash, Lesions, Vito, Bruising, Other





- Medications/Allergies


Allergies/Adverse Reactions: 


 Allergies











Allergy/AdvReac Type Severity Reaction Status Date / Time


 


NSAIDS (Non-Steroidal Allergy  stomach Verified 12/18/18 12:58





Anti-Inflamma   ulcers  


 


Penicillins Allergy  Hives Verified 12/18/18 12:58











Medications: 


 Current Medications





Acetaminophen (Tylenol)  650 mg PO Q4H PRN


   PRN Reason: Headache/Fever or Pain


   Last Admin: 12/18/18 22:52 Dose:  650 mg


Hydrocodone Bitart/Acetaminophen (Norco 5/325)  1 tab PO Q4H PRN


   PRN Reason: Mild  Pain 0-3


Hydrocodone Bitart/Acetaminophen (Norco 5/325)  2 tab PO Q4H PRN


   PRN Reason: For Moderate  Pain 4-6


   Last Admin: 12/20/18 10:17 Dose:  2 tab


Amlodipine Besylate (Norvasc)  5 mg PO QAM [facility]


   Last Admin: 12/20/18 09:14 Dose:  5 mg


Artificial Tears (Tears Naturale)  2 drop EA EYE PRN PRN


   PRN Reason: Dry Eyes


Aspirin (Ecotrin)  81 mg PO BID [facility]


   Last Admin: 12/20/18 09:14 Dose:  81 mg


Diphenhydramine HCl (Benadryl)  25 mg IM Q3H PRN


   PRN Reason: Itching


Diphenhydramine HCl (Benadryl)  25 mg IVP Q3H PRN


   PRN Reason: Itching


Diphenhydramine HCl (Benadryl)  25 mg PO Q6H PRN


   PRN Reason: Itching


Fenofibrate (Tricor)  145 mg PO HS [facility]


   Last Admin: 12/19/18 20:37 Dose:  145 mg


Fentanyl (Duragesic)  100 mcg TD Q3D [facility]


   Last Admin: 12/19/18 06:53 Dose:  100 mcg


Ferrous Gluconate (Fergon)  324 mg PO BID [facility]


   Last Admin: 12/20/18 09:15 Dose:  324 mg


Guaifenesin (Robitussin Sf)  200 mg PO Q4H PRN


   PRN Reason: Cough


HCTZ/Losartan Potassium (Hyzaar 100/25)  1 tab PO QAM [facility]


   Last Admin: 12/20/18 09:14 Dose:  1 tab


Hydralazine HCl (Apresoline)  10 mg SLOW IVP Q4H PRN


   PRN Reason: SBP > 180 and HR < 70


Fentanyl Citrate (Fentanyl/Bupivacaine)  100 mls @ 10 mls/hr EPIDURAL INF [facility]


   Last Admin: 12/20/18 08:58 Dose:  100 mls


Sodium Chloride (Normal Saline 0.9%)  1,000 mls @ 100 mls/hr IV .Q10H [facility]


   Last Admin: 12/20/18 02:51 Dose:  Not Given


Iron/Minerals/Multivitamins (Theragran M)  1 tab PO DAILY [facility]


   Last Admin: 12/20/18 09:14 Dose:  1 tab


Loperamide HCl (Imodium)  2 mg PO PRN PRN


   PRN Reason: Diarrhea/Loose Stools


Loratadine (Claritin)  10 mg PO DAILY [facility]


   Last Admin: 12/20/18 09:15 Dose:  10 mg


Mineral Oil/White Petrolatum (Eucerin Cream)  0 gm TOP BIDPRN PRN


   PRN Reason: Dry Skin


Miscellaneous Information (Communication Order-Pharmacy)  1 each FS ASDIR [facility]


Naloxone HCl (Narcan)  0.2 mg IV Q5MIN PRN


   PRN Reason: RR <=8 OR OBTUNDED/UNAROUSABLE


Naloxone HCl (Narcan)  0.1 mg IVP Q15MIN PRN


   PRN Reason: URINARY RETENTION


Ondansetron HCl (Zofran)  4 mg IVP Q6H PRN


   PRN Reason: Nausea/Vomiting


   Last Admin: 12/18/18 21:21 Dose:  4 mg


Pantoprazole Sodium (Protonix)  40 mg PO QAM [facility]


   Last Admin: 12/20/18 09:17 Dose:  40 mg


Promethazine HCl (Phenergan)  12.5 mg IM Q4H PRN


   PRN Reason: Nausea


   Last Admin: 12/18/18 23:40 Dose:  12.5 mg


Promethazine HCl (Phenergan Suppository)  25 mg VT Q4H PRN


   PRN Reason: Nausea/Vomiting


Senna/Docusate Sodium (Senokot S)  2 tab PO BID [facility]


   Last Admin: 12/20/18 09:15 Dose:  2 tab


Sodium Chloride (Flush - Normal Saline)  10 ml IVF PRN PRN


   PRN Reason: Saline Flush


   Last Admin: 12/18/18 21:22 Dose:  10 ml


Throat Lozenges (Cepastat Lozenges)  1 haris PO Q2H PRN


   PRN Reason: Sore Throat


Tramadol HCl (Ultram)  50 mg PO Q6H PRN


   PRN Reason: Mild Pain 1-3


Tramadol HCl (Ultram)  100 mg PO Q6H PRN


   PRN Reason: Moderate Pain 4-6


   Last Admin: 12/20/18 09:11 Dose:  100 mg


Zolpidem Tartrate (Ambien)  5 mg PO HSPRN PRN


   PRN Reason: Insomnia

## 2018-12-21 VITALS — DIASTOLIC BLOOD PRESSURE: 75 MMHG | TEMPERATURE: 100.2 F | SYSTOLIC BLOOD PRESSURE: 113 MMHG

## 2018-12-21 LAB
HGB BLD-MCNC: 9.5 G/DL (ref 14–18)
MCH RBC QN AUTO: 29.4 PG (ref 27–31)
MCV RBC AUTO: 88.5 FL (ref 78–98)
PLATELET # BLD AUTO: 234 THOU/UL (ref 130–400)
RBC # BLD AUTO: 3.23 MILL/UL (ref 4.7–6.1)
WBC # BLD AUTO: 9.2 THOU/UL (ref 4.8–10.8)

## 2018-12-21 RX ADMIN — PANTOPRAZOLE SODIUM SCH MG: 40 GRANULE, DELAYED RELEASE ORAL at 08:46

## 2018-12-21 RX ADMIN — MULTIPLE VITAMINS W/ MINERALS TAB SCH TAB: TAB at 08:45

## 2018-12-21 RX ADMIN — HYDROCODONE BITARTRATE AND ACETAMINOPHEN PRN TAB: 10; 325 TABLET ORAL at 14:21

## 2018-12-21 RX ADMIN — LOSARTAN POTASSIUM AND HYDROCHLOROTHIAZIDE SCH TAB: 100; 25 TABLET, FILM COATED ORAL at 08:43

## 2018-12-21 RX ADMIN — HYDROCODONE BITARTRATE AND ACETAMINOPHEN PRN TAB: 10; 325 TABLET ORAL at 03:49

## 2018-12-21 RX ADMIN — HYDROCODONE BITARTRATE AND ACETAMINOPHEN PRN TAB: 10; 325 TABLET ORAL at 08:46

## 2018-12-21 RX ADMIN — ASPIRIN SCH MG: 81 TABLET ORAL at 08:46

## 2018-12-21 RX ADMIN — DOCUSATE SODIUM 50 MG AND SENNOSIDES 8.6 MG SCH TAB: 8.6; 5 TABLET, FILM COATED ORAL at 08:46

## 2018-12-21 NOTE — PDOC.PN
- Subjective


Encounter Start Date: 12/21/18


Encounter Start Time: 10:00





Patient seen and examined. No new complaints. No overnight events





- Objective


Resuscitation Status - Order Detail:





12/19/18 07:38


Resuscitation Status Routine 


   Resuscitation Status: FULL: Full Resuscitation








MAR Reviewed: Yes


Vital Signs & Weight: 


 Vital Signs (12 hours)











  Temp Pulse Resp BP Pulse Ox


 


 12/21/18 08:45   60   


 


 12/21/18 07:34  99.4 F  60  18  116/71  98


 


 12/21/18 04:31  97.9 F  60  20  118/72  98


 


 12/21/18 00:00  99.1 F  55 L  20  95/57 L  98








 Weight











Weight                         200 lb














I&O: 


 











 12/20/18 12/21/18 12/22/18





 06:59 06:59 06:59


 


Intake Total 2380 1650 


 


Output Total 1500 2000 


 


Balance 880 -350 











Result Diagrams: 


 12/21/18 08:01








Phys Exam





- Physical Examination


Constitutional: NAD


HEENT: PERRLA, moist MMs, sclera anicteric


Neck: no JVD, supple


Respiratory: no wheezing, no rales, no rhonchi


Cardiovascular: RRR, no significant murmur, no rub


Gastrointestinal: soft, non-tender, no distention, positive bowel sounds


Musculoskeletal: no edema, pulses present


Neurological: non-focal, normal sensation, moves all 4 limbs


Lymphatic: no nodes


Psychiatric: normal affect, A&O x 3


Skin: no rash, normal turgor





Dx/Plan


(1) Anemia, normocytic normochromic


Code(s): D64.9 - ANEMIA, UNSPECIFIED   Status: Acute   Comment: post operative 

blood loss   





(2) Fever


Code(s): R50.9 - FEVER, UNSPECIFIED   Status: Acute   





(3) Status post total hip replacement, left


Code(s): Z96.642 - PRESENCE OF LEFT ARTIFICIAL HIP JOINT   Status: Acute   





(4) Dyslipidemia


Code(s): E78.5 - HYPERLIPIDEMIA, UNSPECIFIED   Status: Chronic   





(5) GERD (gastroesophageal reflux disease)


Code(s): K21.9 - GASTRO-ESOPHAGEAL REFLUX DISEASE WITHOUT ESOPHAGITIS   Status: 

Chronic   


Qualifiers: 


   Esophagitis presence: with esophagitis   Qualified Code(s): K21.0 - Gastro-

esophageal reflux disease with esophagitis   





(6) HTN (hypertension)


Code(s): I10 - ESSENTIAL (PRIMARY) HYPERTENSION   Status: Chronic   


Qualifiers: 


   Hypertension type: essential hypertension   Qualified Code(s): I10 - 

Essential (primary) hypertension   





(7) Tobacco abuse


Code(s): Z72.0 - TOBACCO USE   Status: Chronic   





- Plan


cont current plan of care





* medication reviewed as below


* symptomatic treatment


* see discharge summery.








Review of Systems





- Review of Systems


ENT: negative: Ear Pain, Ear Discharge, Nose Pain, Nose Discharge, Nose 

Congestion, Mouth Pain, Mouth Swelling, Throat Pain, Throat Swelling, Other


Respiratory: negative: Cough, Dry, Shortness of Breath, Hemoptysis, SOB with 

Excertion, Pleuritic Pain, Sputum, Wheezing


Cardiovascular: negative: chest pain, palpitations, orthopnea, paroxysmal 

nocturnal dyspnea, edema, light headedness, other


Gastrointestinal: negative: Nausea, Vomiting, Abdominal Pain, Diarrhea, 

Constipation, Melena, Hematochezia, Other


Genitourinary: negative: Dysuria, Frequency, Incontinence, Hematuria, Retention

, Other


Musculoskeletal: negative: Neck Pain, Shoulder Pain, Arm Pain, Back Pain, Hand 

Pain, Leg Pain, Foot Pain, Other


Skin: negative: Rash, Lesions, Vito, Bruising, Other





- Medications/Allergies


Allergies/Adverse Reactions: 


 Allergies











Allergy/AdvReac Type Severity Reaction Status Date / Time


 


NSAIDS (Non-Steroidal Allergy  stomach Verified 12/18/18 12:58





Anti-Inflamma   ulcers  


 


Penicillins Allergy  Hives Verified 12/18/18 12:58











Medications: 


 Current Medications





Acetaminophen (Tylenol)  650 mg PO Q4H PRN


   PRN Reason: Headache/Fever or Pain


   Last Admin: 12/20/18 21:19 Dose:  650 mg


Hydrocodone Bitart/Acetaminophen (Norco 10/325)  1 tab PO Q4H PRN


   PRN Reason: Mild-Moderate Pain (1-4)


Hydrocodone Bitart/Acetaminophen (Norco 10/325)  2 tab PO Q4H PRN


   PRN Reason: Moderate to Severe Pain (5-10)


   Last Admin: 12/21/18 08:46 Dose:  2 tab


Amlodipine Besylate (Norvasc)  5 mg PO QAM [facility]


   Last Admin: 12/21/18 08:45 Dose:  5 mg


Artificial Tears (Tears Naturale)  2 drop EA EYE PRN PRN


   PRN Reason: Dry Eyes


Aspirin (Ecotrin)  81 mg PO BID [facility]


   Last Admin: 12/21/18 08:46 Dose:  81 mg


Diphenhydramine HCl (Benadryl)  25 mg IM Q3H PRN


   PRN Reason: Itching


Diphenhydramine HCl (Benadryl)  25 mg IVP Q3H PRN


   PRN Reason: Itching


Diphenhydramine HCl (Benadryl)  25 mg PO Q6H PRN


   PRN Reason: Itching


Fenofibrate (Tricor)  145 mg PO HS [facility]


   Last Admin: 12/20/18 21:20 Dose:  145 mg


Fentanyl (Duragesic)  100 mcg TD NOW [facility]


   Stop: 12/24/18 10:29


   Last Admin: 12/21/18 10:58 Dose:  100 mcg


Ferrous Gluconate (Fergon)  324 mg PO BID [facility]


   Last Admin: 12/21/18 08:44 Dose:  324 mg


Guaifenesin (Robitussin Sf)  200 mg PO Q4H PRN


   PRN Reason: Cough


HCTZ/Losartan Potassium (Hyzaar 100/25)  1 tab PO QAM [facility]


   Last Admin: 12/21/18 08:43 Dose:  1 tab


Hydralazine HCl (Apresoline)  10 mg SLOW IVP Q4H PRN


   PRN Reason: SBP > 180 and HR < 70


Sodium Chloride (Normal Saline 0.9%)  1,000 mls @ 100 mls/hr IV .Q10H [facility]


   Last Admin: 12/21/18 08:54 Dose:  Not Given


Iron/Minerals/Multivitamins (Theragran M)  1 tab PO DAILY [facility]


   Last Admin: 12/21/18 08:45 Dose:  1 tab


Loperamide HCl (Imodium)  2 mg PO PRN PRN


   PRN Reason: Diarrhea/Loose Stools


Loratadine (Claritin)  10 mg PO DAILY [facility]


   Last Admin: 12/21/18 08:44 Dose:  10 mg


Mineral Oil/White Petrolatum (Eucerin Cream)  0 gm TOP BIDPRN PRN


   PRN Reason: Dry Skin


Miscellaneous Information (Communication Order-Pharmacy)  1 each FS ASDIR [facility]


Naloxone HCl (Narcan)  0.2 mg IV Q5MIN PRN


   PRN Reason: RR <=8 OR OBTUNDED/UNAROUSABLE


Naloxone HCl (Narcan)  0.1 mg IVP Q15MIN PRN


   PRN Reason: URINARY RETENTION


Ondansetron HCl (Zofran)  4 mg IVP Q6H PRN


   PRN Reason: Nausea/Vomiting


   Last Admin: 12/18/18 21:21 Dose:  4 mg


Pantoprazole Sodium (Protonix)  40 mg PO QAM [facility]


   Last Admin: 12/21/18 08:46 Dose:  40 mg


Promethazine HCl (Phenergan)  12.5 mg IM Q4H PRN


   PRN Reason: Nausea


   Last Admin: 12/18/18 23:40 Dose:  12.5 mg


Promethazine HCl (Phenergan Suppository)  25 mg ND Q4H PRN


   PRN Reason: Nausea/Vomiting


Senna/Docusate Sodium (Senokot S)  2 tab PO BID [facility]


   Last Admin: 12/21/18 08:46 Dose:  2 tab


Sodium Chloride (Flush - Normal Saline)  10 ml IVF PRN PRN


   PRN Reason: Saline Flush


   Last Admin: 12/18/18 21:22 Dose:  10 ml


Throat Lozenges (Cepastat Lozenges)  1 haris PO Q2H PRN


   PRN Reason: Sore Throat


Tramadol HCl (Ultram)  50 mg PO Q6H PRN


   PRN Reason: Mild Pain 1-3


Tramadol HCl (Ultram)  100 mg PO Q6H PRN


   PRN Reason: Moderate Pain 4-6


   Last Admin: 12/20/18 09:11 Dose:  100 mg


Zolpidem Tartrate (Ambien)  5 mg PO HSPRN PRN


   PRN Reason: Insomnia

## 2018-12-21 NOTE — DIS
DATE OF ADMISSION:  12/18/2018



DATE OF DISCHARGE:  12/21/2018



DISCHARGE SUMMARY/PROGRESS NOTE/TRANSFER OF CARE NOTE



PRIMARY CARE PHYSICIAN:  Chasity Trejo MD



DISCHARGE DISPOSITION:  Home.



PRIMARY DISCHARGE DIAGNOSES:  

1. Status post left total hip replacement.

2. Acute febrile illness.



SECONDARY DISCHARGE DIAGNOSES:  

1. Tobacco abuse disorder.

2. Hypertension.

3. Gastroesophageal reflux disease.

4. Dyslipidemia.

5. Normocytic normochromic anemia.



PRIMARY PROCEDURE/OPERATION:  Left hip replacement by Dr. Obrien.



RADIOLOGICAL INVESTIGATION:  Hip x-ray.



SIGNIFICANT LABORATORY DATA:  WBC 9.2, hemoglobin 9.5, and platelet 234.



DISCHARGE MEDICATION:  

1. Levofloxacin 500 mg p.o. daily for 5 days.

2. Aspirin 81 mg p.o. b.i.d. for DVT prophylaxis.

3. Norco 10 one or two tablets q.6h hourly p.r.n. for pain.

4. Norvasc 5 mg p.o. daily.

5. Cetirizine 10 mg p.o. daily.

6. TriCor 145 mg p.o. at bedtime.

7. Fentanyl 100 mcg transdermal every three days.

8. Losartan with hydrochlorothiazide 1 tablet p.o. daily.

9. Protonix 40 mg p.o. daily.

10. Testosterone via pump.



CONTRAINDICATION:  None.



CODE STATUS:  Full code.



INPATIENT CONSULTANT:  Dr. Obrien was primary while in the hospital.  Sound Team was

consulted for medical management. 



TEST RESULTS PENDING ON DISCHARGE:  None.



ALLERGIES:  NSAIDS AND PENICILLIN.



DISCHARGE PLAN:  Posthospital, the patient will follow up with primary care

physician in one week.  The patient has appointment with Dr. Obrien on January 2, 2019, at 2:15 p.m. 



HOSPITAL COURSE:  A 58-year-old male with above-mentioned medical problem, who was

electively admitted by Dr. Obrien for left hip replacement, which was done on

December 18, 2018.  Postoperatively, Sound Team was consulted for medical

comanagement.  The patient's medical problems remained stable.  We continued all his

home medications while in the hospital.  He was given aspirin for DVT prophylaxis.

He had epidural for pain control.  He did relatively well with PT/OT protocol while

in the hospital.  The patient had a febrile illness while in hospital, but the

patient was continuously getting fever while in hospital and that is why for benefit

of doubt, we gave him prescription for levofloxacin upon discharge for 5 more days. 



The patient is seen and examined at bedside today.



REVIEW OF SYSTEMS:  All review of system reviewed with him and negative.



PHYSICAL EXAMINATION:

VITAL SIGNS:  Currently; temperature 99.4, pulse 60, respiratory rate 18, saturation

98% on room air, blood pressure 116/71, and weight 200 pounds. 

GENERAL:  The patient is currently alert, awake.  No obvious acute distress. 

HEENT:  Head; normocephalic, atraumatic.  Eyes; pupils round and reactive to light.

Extraocular muscle intact.  ENT; oropharynx within normal limits.  Moist mucous

membranes.  No oral lesion.  No pharyngeal erythema.  No exudate. 

NECK:  Supple.  No JVD.  No thyromegaly.  No carotid bruit.  No jugular venous

distention. 

LUNGS:  Clear to auscultation without any rhonchi or rales. 

CARDIAC:  S1 and S2, regular without any murmur. 

ABDOMEN:  Soft and benign without any tenderness. 

EXTREMITIES:  No edema. 

NEUROLOGIC:  Nonfocal examination.  Surgical site is clean and healthy. 



The patient is planned for discharge by primary team and we will sign off.







Job ID:  [id number]

## 2019-09-24 ENCOUNTER — HOSPITAL ENCOUNTER (OUTPATIENT)
Dept: HOSPITAL 92 - SCSRAD | Age: 59
Discharge: HOME | End: 2019-09-24
Attending: FAMILY MEDICINE
Payer: COMMERCIAL

## 2019-09-24 DIAGNOSIS — J20.9: Primary | ICD-10-CM

## 2019-09-24 PROCEDURE — 71046 X-RAY EXAM CHEST 2 VIEWS: CPT

## 2019-09-24 NOTE — RAD
EXAM:

Chest 2 views:



HISTORY:

Congestion and fever



COMPARISON:

12/14/2018



FINDINGS:

There is a normal-sized cardiomediastinal silhouette. There is no evidence of consolidation, mass, or
 pleural effusion.   Degenerative changes are seen in the spine.



IMPRESSION:

No evidence of acute cardiopulmonary disease



Reported By: Carrington Diaz 

Electronically Signed:  9/24/2019 2:30 PM

## 2019-12-04 ENCOUNTER — HOSPITAL ENCOUNTER (OUTPATIENT)
Dept: HOSPITAL 92 - RAD | Age: 59
Discharge: HOME | End: 2019-12-04
Attending: INTERNAL MEDICINE
Payer: COMMERCIAL

## 2019-12-04 DIAGNOSIS — R06.00: Primary | ICD-10-CM

## 2019-12-04 PROCEDURE — 71046 X-RAY EXAM CHEST 2 VIEWS: CPT

## 2019-12-04 NOTE — RAD
PA AND LATERAL VIEWS OF THE CHEST:

12/4/19

 

HISTORY: 

Dyspnea.

 

FINDINGS: 

The heart size is normal. The aorta is tortuous. The lungs are well expanded without lobar consolidat
ion, pneumothoraces, or pleural effusions. There are mild degenerative changes in the spine. 

 

IMPRESSION: 

No radiographic evidence of acute cardiopulmonary process. 

 

POS: [facility]

## 2020-12-10 ENCOUNTER — HOSPITAL ENCOUNTER (INPATIENT)
Dept: HOSPITAL 92 - SURG A | Age: 60
LOS: 8 days | Discharge: HOME | DRG: 468 | End: 2020-12-18
Attending: ORTHOPAEDIC SURGERY | Admitting: ORTHOPAEDIC SURGERY
Payer: COMMERCIAL

## 2020-12-10 ENCOUNTER — HOSPITAL ENCOUNTER (OUTPATIENT)
Dept: HOSPITAL 92 - LABBT | Age: 60
Discharge: HOME | End: 2020-12-10
Attending: ORTHOPAEDIC SURGERY
Payer: COMMERCIAL

## 2020-12-10 VITALS — BODY MASS INDEX: 27.1 KG/M2

## 2020-12-10 DIAGNOSIS — Z87.891: ICD-10-CM

## 2020-12-10 DIAGNOSIS — Z88.0: ICD-10-CM

## 2020-12-10 DIAGNOSIS — I10: ICD-10-CM

## 2020-12-10 DIAGNOSIS — K27.9: ICD-10-CM

## 2020-12-10 DIAGNOSIS — J30.2: ICD-10-CM

## 2020-12-10 DIAGNOSIS — Z01.818: Primary | ICD-10-CM

## 2020-12-10 DIAGNOSIS — Z20.828: ICD-10-CM

## 2020-12-10 DIAGNOSIS — T84.010A: Primary | ICD-10-CM

## 2020-12-10 DIAGNOSIS — M19.90: ICD-10-CM

## 2020-12-10 DIAGNOSIS — E78.5: ICD-10-CM

## 2020-12-10 DIAGNOSIS — Z88.6: ICD-10-CM

## 2020-12-10 DIAGNOSIS — Z79.899: ICD-10-CM

## 2020-12-10 DIAGNOSIS — T84.010A: ICD-10-CM
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
## 2020-12-10 LAB
ANION GAP SERPL CALC-SCNC: 16 MMOL/L (ref 10–20)
BASOPHILS # BLD AUTO: 0 10X3/UL (ref 0–0.2)
BASOPHILS NFR BLD AUTO: 0.7 % (ref 0–2)
BUN SERPL-MCNC: 23 MG/DL (ref 8.4–25.7)
CALCIUM SERPL-MCNC: 10 MG/DL (ref 7.8–10.44)
CHLORIDE SERPL-SCNC: 109 MMOL/L (ref 98–107)
CO2 SERPL-SCNC: 23 MMOL/L (ref 22–29)
CREAT CL PREDICTED SERPL C-G-VRATE: 0 ML/MIN (ref 70–130)
EOSINOPHIL # BLD AUTO: 0.2 10X3/UL (ref 0–0.5)
EOSINOPHIL NFR BLD AUTO: 2.9 % (ref 0–6)
GLUCOSE SERPL-MCNC: 95 MG/DL (ref 70–105)
HGB BLD-MCNC: 13 G/DL (ref 14–18)
INR PPP: 1
LYMPHOCYTES NFR BLD AUTO: 21.5 % (ref 18–47)
MCH RBC QN AUTO: 28 PG (ref 27–33)
MCV RBC AUTO: 90.3 FL (ref 80–100)
MONOCYTES # BLD AUTO: 0.5 10X3/UL (ref 0–1.1)
MONOCYTES NFR BLD AUTO: 9 % (ref 0–10)
NEUTROPHILS # BLD AUTO: 3.9 10X3/UL (ref 1.5–8.4)
NEUTROPHILS NFR BLD AUTO: 65.4 % (ref 40–75)
PLATELET # BLD AUTO: 325 10X3/UL (ref 130–400)
POTASSIUM SERPL-SCNC: 4.7 MMOL/L (ref 3.5–5.1)
PROTHROMBIN TIME: 10.4 SEC (ref 9.5–12.1)
RBC # BLD AUTO: 4.65 10X6/UL (ref 4.4–5.8)
SODIUM SERPL-SCNC: 143 MMOL/L (ref 136–145)
WBC # BLD AUTO: 5.9 10X3/UL (ref 4.5–11)

## 2020-12-10 PROCEDURE — 36415 COLL VENOUS BLD VENIPUNCTURE: CPT

## 2020-12-10 PROCEDURE — 87205 SMEAR GRAM STAIN: CPT

## 2020-12-10 PROCEDURE — 87635 SARS-COV-2 COVID-19 AMP PRB: CPT

## 2020-12-10 PROCEDURE — 80048 BASIC METABOLIC PNL TOTAL CA: CPT

## 2020-12-10 PROCEDURE — U0003 INFECTIOUS AGENT DETECTION BY NUCLEIC ACID (DNA OR RNA); SEVERE ACUTE RESPIRATORY SYNDROME CORONAVIRUS 2 (SARS-COV-2) (CORONAVIRUS DISEASE [COVID-19]), AMPLIFIED PROBE TECHNIQUE, MAKING USE OF HIGH THROUGHPUT TECHNOLOGIES AS DESCRIBED BY CMS-2020-01-R: HCPCS

## 2020-12-10 PROCEDURE — 85610 PROTHROMBIN TIME: CPT

## 2020-12-10 PROCEDURE — 93005 ELECTROCARDIOGRAM TRACING: CPT

## 2020-12-10 PROCEDURE — 87081 CULTURE SCREEN ONLY: CPT

## 2020-12-10 PROCEDURE — 85027 COMPLETE CBC AUTOMATED: CPT

## 2020-12-10 PROCEDURE — S0020 INJECTION, BUPIVICAINE HYDRO: HCPCS

## 2020-12-10 PROCEDURE — 87070 CULTURE OTHR SPECIMN AEROBIC: CPT

## 2020-12-10 PROCEDURE — 85025 COMPLETE CBC W/AUTO DIFF WBC: CPT

## 2020-12-10 PROCEDURE — 93010 ELECTROCARDIOGRAM REPORT: CPT

## 2020-12-10 PROCEDURE — C1776 JOINT DEVICE (IMPLANTABLE): HCPCS

## 2020-12-15 PROCEDURE — 0SR903A REPLACEMENT OF RIGHT HIP JOINT WITH CERAMIC SYNTHETIC SUBSTITUTE, UNCEMENTED, OPEN APPROACH: ICD-10-PCS | Performed by: ORTHOPAEDIC SURGERY

## 2020-12-15 PROCEDURE — 0SP90JZ REMOVAL OF SYNTHETIC SUBSTITUTE FROM RIGHT HIP JOINT, OPEN APPROACH: ICD-10-PCS | Performed by: ORTHOPAEDIC SURGERY

## 2020-12-15 RX ADMIN — ASPIRIN SCH: 81 TABLET ORAL at 12:21

## 2020-12-15 RX ADMIN — DOCUSATE SODIUM 50 MG AND SENNOSIDES 8.6 MG SCH: 8.6; 5 TABLET, FILM COATED ORAL at 12:23

## 2020-12-15 RX ADMIN — ASPIRIN SCH MG: 81 TABLET ORAL at 19:38

## 2020-12-15 RX ADMIN — PANTOPRAZOLE SODIUM SCH: 40 GRANULE, DELAYED RELEASE ORAL at 12:23

## 2020-12-15 RX ADMIN — FENTANYL CITRATE SCH MLS: 50 INJECTION, SOLUTION INTRAMUSCULAR; INTRAVENOUS at 22:30

## 2020-12-15 RX ADMIN — HYDROCODONE BITARTRATE AND ACETAMINOPHEN PRN TAB: 5; 325 TABLET ORAL at 22:35

## 2020-12-15 RX ADMIN — MULTIPLE VITAMINS W/ MINERALS TAB SCH: TAB at 12:22

## 2020-12-15 RX ADMIN — PANTOPRAZOLE SODIUM SCH MG: 40 GRANULE, DELAYED RELEASE ORAL at 13:42

## 2020-12-15 RX ADMIN — HYDROCODONE BITARTRATE AND ACETAMINOPHEN PRN TAB: 5; 325 TABLET ORAL at 17:32

## 2020-12-15 RX ADMIN — HYDROCODONE BITARTRATE AND ACETAMINOPHEN PRN TAB: 5; 325 TABLET ORAL at 13:37

## 2020-12-15 RX ADMIN — DOCUSATE SODIUM 50 MG AND SENNOSIDES 8.6 MG SCH TAB: 8.6; 5 TABLET, FILM COATED ORAL at 19:38

## 2020-12-15 NOTE — RAD
XR Hip Rt 2-3 View



History: Postop hip



Comparison: 



October 28, 2020



Findings: New right longstem hip arthroplasty with cerclage wire.



Impression: Satisfactory postoperative appearance.



Reported By: Flavio Lockett 

Electronically Signed:  12/15/2020 10:14 AM

## 2020-12-15 NOTE — OP
DATE OF PROCEDURE:  12/15/2020



TITLE OF PROCEDURE:  Right revision total hip arthroplasty using a [place]

Restoration Modular Hip System with a size 17 stem, a 21 body, +10 standard ceramic

head with an MDM cup size 46 mm OD and a 56-mm Trident Revision acetabulum with

Restoration Modular liner. 



ASSISTANT:  Rajesh Rehman PA-C



BLOOD LOSS:  About 300.



SPECIMEN:  Tissue was sent for culture, but fluid appeared to be clear.



COMPLICATIONS:  None. 



The assistant/co-surgeon was present through the entire procedure and was

responsible for providing exposure, tissue retraction and any necessary limb or

tissue manipulation required to obtain necessary reduction or hardware placement.

The assistant/co-surgeon also provided bleeding control, tissue closure, and

suturing in conjunction with the primary surgeon. 



DESCRIPTION OF PROCEDURE:  After appropriate consent was obtained, the patient was

taken to the operating room where general anesthesia induced.  He received

vancomycin and Levaquin preoperatively and was placed in the hip butterfield device on

his lateral side.  A standard anterolateral approach was made to the hip.  The hip

capsular tissue was excised.  Hip was dislocated with meticulous care, completely

exposed the proximal femur.  I then removed the femur with direct compression.  I

exposed the acetabulum and removed all the pseudocapsule from around the acetabulum

with a polyethylene liner and then removed 3 screws from the bone.  Hip was removed

with Innomed cup removal device.  Excellent bone preservation was achieved.

Pulsatile lavage irrigation performed, reamed up to 56.  A size 56-mm Trident

revision cup was placed with excellent press fit.  No additional screw fixation was

required.  The liner was deployed.  Attention was then turned to the femur, which

was opened with an in-cutter and then followed up to size 17 mm, countersunk this

about a centimeter below the greater trochanter, just gave me some leeway either

direction.  The 17-mm stem was impacted into place.  I did lateralize the greater

trochanter, reamed to a size 21, just series of trial reductions.  Implants were

selected as above.  I assembled the implants using a torque wrench as per protocol.

Hip was reduced and found to be stable throughout the range of motion.  Pulsatile

lavage irrigation performed.  Abductor was repaired with #1 Vicryl, #5 Ethibond, and

#2 Vicryl and the IT band was closed with 0 Stratafix, subcu with 2-0 Stratafix, and

skin with 3-0 Stratafix and glue.  Sterile dressings applied. 







Job ID:  [id number]

## 2020-12-16 LAB
HGB BLD-MCNC: 11 G/DL (ref 14–18)
MCH RBC QN AUTO: 29.1 PG (ref 27–31)
MCV RBC AUTO: 88.1 FL (ref 78–98)
PLATELET # BLD AUTO: 215 THOU/UL (ref 130–400)
RBC # BLD AUTO: 3.78 MILL/UL (ref 4.7–6.1)
WBC # BLD AUTO: 7.3 THOU/UL (ref 4.8–10.8)

## 2020-12-16 RX ADMIN — DOCUSATE SODIUM 50 MG AND SENNOSIDES 8.6 MG SCH TAB: 8.6; 5 TABLET, FILM COATED ORAL at 08:31

## 2020-12-16 RX ADMIN — MULTIPLE VITAMINS W/ MINERALS TAB SCH TAB: TAB at 08:31

## 2020-12-16 RX ADMIN — DOCUSATE SODIUM 50 MG AND SENNOSIDES 8.6 MG SCH TAB: 8.6; 5 TABLET, FILM COATED ORAL at 20:50

## 2020-12-16 RX ADMIN — HYDROCODONE BITARTRATE AND ACETAMINOPHEN PRN TAB: 5; 325 TABLET ORAL at 20:50

## 2020-12-16 RX ADMIN — FENTANYL CITRATE SCH MLS: 50 INJECTION, SOLUTION INTRAMUSCULAR; INTRAVENOUS at 06:09

## 2020-12-16 RX ADMIN — PANTOPRAZOLE SODIUM SCH: 40 GRANULE, DELAYED RELEASE ORAL at 08:37

## 2020-12-16 RX ADMIN — HYDROCODONE BITARTRATE AND ACETAMINOPHEN PRN TAB: 5; 325 TABLET ORAL at 06:16

## 2020-12-16 RX ADMIN — ASPIRIN SCH MG: 81 TABLET ORAL at 08:31

## 2020-12-16 RX ADMIN — FENTANYL CITRATE SCH MLS: 50 INJECTION, SOLUTION INTRAMUSCULAR; INTRAVENOUS at 15:13

## 2020-12-16 RX ADMIN — ASPIRIN SCH MG: 81 TABLET ORAL at 20:50

## 2020-12-17 LAB
HGB BLD-MCNC: 10.2 G/DL (ref 14–18)
MCH RBC QN AUTO: 29.4 PG (ref 27–31)
MCV RBC AUTO: 88.6 FL (ref 78–98)
PLATELET # BLD AUTO: 203 THOU/UL (ref 130–400)
RBC # BLD AUTO: 3.49 MILL/UL (ref 4.7–6.1)
WBC # BLD AUTO: 8.3 THOU/UL (ref 4.8–10.8)

## 2020-12-17 RX ADMIN — FENTANYL CITRATE SCH MLS: 50 INJECTION, SOLUTION INTRAMUSCULAR; INTRAVENOUS at 01:07

## 2020-12-17 RX ADMIN — ASPIRIN SCH MG: 81 TABLET ORAL at 20:13

## 2020-12-17 RX ADMIN — MULTIPLE VITAMINS W/ MINERALS TAB SCH TAB: TAB at 08:51

## 2020-12-17 RX ADMIN — PANTOPRAZOLE SODIUM SCH: 40 GRANULE, DELAYED RELEASE ORAL at 08:53

## 2020-12-17 RX ADMIN — ASPIRIN SCH MG: 81 TABLET ORAL at 08:52

## 2020-12-17 RX ADMIN — FENTANYL CITRATE SCH MLS: 50 INJECTION, SOLUTION INTRAMUSCULAR; INTRAVENOUS at 11:09

## 2020-12-17 RX ADMIN — DOCUSATE SODIUM 50 MG AND SENNOSIDES 8.6 MG SCH TAB: 8.6; 5 TABLET, FILM COATED ORAL at 08:51

## 2020-12-17 RX ADMIN — DOCUSATE SODIUM 50 MG AND SENNOSIDES 8.6 MG SCH TAB: 8.6; 5 TABLET, FILM COATED ORAL at 20:13

## 2020-12-17 RX ADMIN — FENTANYL CITRATE SCH MLS: 50 INJECTION, SOLUTION INTRAMUSCULAR; INTRAVENOUS at 21:41

## 2020-12-17 NOTE — PRG
DATE OF SERVICE:  12/17/2020



SUBJECTIVE:  Israel is a 60-year-old male postop day 2 from a revision right total

hip arthroplasty.  He run some episodic fevers and little bit of nausea.  He is

ambulating well and he feels relatively well, but he is still having a little bit of

nausea.  There is a subjective history of a contact of COVID positive individuals

just prior to surgery, but after his COVID test was taken for preadmission.  Due to

this gap and mild temperatures, I will go ahead and obtain a COVID test inpatient. 



OBJECTIVE:  VITAL SIGNS:  Temperature 100.4, pulse 101, respiratory rate 20, blood

pressure 110/60. 

GENERAL:  He is alert, responsive, and appropriate with examiner.  He does look a

little washed out.  Incision is clean.  No erythema.  No malrotation or shortening. 



LABORATORY DATA:  Hemoglobin 10, hematocrit 30.9.



IMPRESSION:  

1. A 60-year-old male, postop day two, revision right total hip arthroplasty.

2. Questionable COVID exposure and unexplainable temperature elevation.



PLAN:  Going to obtain a COVID test while he is here, but he will stay another night

and I will plan for discharge home tomorrow. 







Job ID:  [id number]

## 2020-12-18 VITALS — TEMPERATURE: 98.9 F | SYSTOLIC BLOOD PRESSURE: 128 MMHG | DIASTOLIC BLOOD PRESSURE: 68 MMHG

## 2020-12-18 LAB
HGB BLD-MCNC: 10.7 G/DL (ref 14–18)
MCH RBC QN AUTO: 29.6 PG (ref 27–31)
MCV RBC AUTO: 89.7 FL (ref 78–98)
PLATELET # BLD AUTO: 206 THOU/UL (ref 130–400)
RBC # BLD AUTO: 3.63 MILL/UL (ref 4.7–6.1)
WBC # BLD AUTO: 9.2 THOU/UL (ref 4.8–10.8)

## 2020-12-18 RX ADMIN — DOCUSATE SODIUM 50 MG AND SENNOSIDES 8.6 MG SCH TAB: 8.6; 5 TABLET, FILM COATED ORAL at 08:34

## 2020-12-18 RX ADMIN — PANTOPRAZOLE SODIUM SCH: 40 GRANULE, DELAYED RELEASE ORAL at 08:36

## 2020-12-18 RX ADMIN — HYDROCODONE BITARTRATE AND ACETAMINOPHEN PRN TAB: 10; 325 TABLET ORAL at 12:50

## 2020-12-18 RX ADMIN — HYDROCODONE BITARTRATE AND ACETAMINOPHEN PRN TAB: 10; 325 TABLET ORAL at 08:32

## 2020-12-18 RX ADMIN — MULTIPLE VITAMINS W/ MINERALS TAB SCH TAB: TAB at 08:35

## 2020-12-18 RX ADMIN — ASPIRIN SCH MG: 81 TABLET ORAL at 08:34

## 2021-12-30 ENCOUNTER — HOSPITAL ENCOUNTER (OUTPATIENT)
Dept: HOSPITAL 92 - LABBT | Age: 61
Discharge: HOME | End: 2021-12-30
Attending: ORTHOPAEDIC SURGERY
Payer: COMMERCIAL

## 2021-12-30 DIAGNOSIS — Z01.818: Primary | ICD-10-CM

## 2021-12-30 DIAGNOSIS — Z20.822: ICD-10-CM

## 2021-12-30 DIAGNOSIS — M17.12: ICD-10-CM

## 2021-12-30 LAB
ANION GAP SERPL CALC-SCNC: 15 MMOL/L (ref 10–20)
BASOPHILS # BLD AUTO: 0.1 10X3/UL (ref 0–0.2)
BASOPHILS NFR BLD AUTO: 0.9 % (ref 0–2)
BUN SERPL-MCNC: 31 MG/DL (ref 8.4–25.7)
CALCIUM SERPL-MCNC: 9.7 MG/DL (ref 7.8–10.44)
CHLORIDE SERPL-SCNC: 108 MMOL/L (ref 98–107)
CO2 SERPL-SCNC: 24 MMOL/L (ref 22–29)
CREAT CL PREDICTED SERPL C-G-VRATE: 0 ML/MIN (ref 70–130)
EOSINOPHIL # BLD AUTO: 0.2 10X3/UL (ref 0–0.5)
EOSINOPHIL NFR BLD AUTO: 2.7 % (ref 0–6)
GLUCOSE SERPL-MCNC: 133 MG/DL (ref 70–105)
HGB BLD-MCNC: 13 G/DL (ref 13.5–17.5)
INR PPP: 1
LYMPHOCYTES NFR BLD AUTO: 23.5 % (ref 18–47)
MCH RBC QN AUTO: 28.8 PG (ref 27–33)
MCV RBC AUTO: 90.5 FL (ref 81.2–95.1)
MONOCYTES # BLD AUTO: 0.6 10X3/UL (ref 0–1.1)
MONOCYTES NFR BLD AUTO: 7.7 % (ref 0–10)
NEUTROPHILS # BLD AUTO: 5 10X3/UL (ref 1.5–8.4)
NEUTROPHILS NFR BLD AUTO: 64.3 % (ref 40–75)
PLATELET # BLD AUTO: 331 10X3/UL (ref 150–450)
POTASSIUM SERPL-SCNC: 5 MMOL/L (ref 3.5–5.1)
PROTHROMBIN TIME: 11.1 SEC (ref 9.5–12.1)
RBC # BLD AUTO: 4.52 10X6/UL (ref 4.32–5.72)
SODIUM SERPL-SCNC: 142 MMOL/L (ref 136–145)
WBC # BLD AUTO: 7.8 10X3/UL (ref 3.5–10.5)

## 2021-12-30 PROCEDURE — 85025 COMPLETE CBC W/AUTO DIFF WBC: CPT

## 2021-12-30 PROCEDURE — 85610 PROTHROMBIN TIME: CPT

## 2021-12-30 PROCEDURE — 93005 ELECTROCARDIOGRAM TRACING: CPT

## 2021-12-30 PROCEDURE — 87081 CULTURE SCREEN ONLY: CPT

## 2021-12-30 PROCEDURE — U0005 INFEC AGEN DETEC AMPLI PROBE: HCPCS

## 2021-12-30 PROCEDURE — 93010 ELECTROCARDIOGRAM REPORT: CPT

## 2021-12-30 PROCEDURE — 80048 BASIC METABOLIC PNL TOTAL CA: CPT

## 2021-12-30 PROCEDURE — U0003 INFECTIOUS AGENT DETECTION BY NUCLEIC ACID (DNA OR RNA); SEVERE ACUTE RESPIRATORY SYNDROME CORONAVIRUS 2 (SARS-COV-2) (CORONAVIRUS DISEASE [COVID-19]), AMPLIFIED PROBE TECHNIQUE, MAKING USE OF HIGH THROUGHPUT TECHNOLOGIES AS DESCRIBED BY CMS-2020-01-R: HCPCS

## 2022-01-04 ENCOUNTER — HOSPITAL ENCOUNTER (INPATIENT)
Dept: HOSPITAL 92 - SDC | Age: 62
LOS: 3 days | Discharge: HOME | DRG: 470 | End: 2022-01-07
Attending: ORTHOPAEDIC SURGERY | Admitting: ORTHOPAEDIC SURGERY
Payer: COMMERCIAL

## 2022-01-04 VITALS — BODY MASS INDEX: 27.7 KG/M2

## 2022-01-04 DIAGNOSIS — M17.12: Primary | ICD-10-CM

## 2022-01-04 PROCEDURE — C1713 ANCHOR/SCREW BN/BN,TIS/BN: HCPCS

## 2022-01-04 PROCEDURE — C1776 JOINT DEVICE (IMPLANTABLE): HCPCS

## 2022-01-04 PROCEDURE — 0SRD0J9 REPLACEMENT OF LEFT KNEE JOINT WITH SYNTHETIC SUBSTITUTE, CEMENTED, OPEN APPROACH: ICD-10-PCS | Performed by: ORTHOPAEDIC SURGERY

## 2022-01-04 PROCEDURE — 76770 US EXAM ABDO BACK WALL COMP: CPT

## 2022-01-04 PROCEDURE — S0020 INJECTION, BUPIVICAINE HYDRO: HCPCS

## 2022-01-04 PROCEDURE — 36415 COLL VENOUS BLD VENIPUNCTURE: CPT

## 2022-01-04 PROCEDURE — A4306 DRUG DELIVERY SYSTEM <=50 ML: HCPCS

## 2022-01-04 PROCEDURE — 85027 COMPLETE CBC AUTOMATED: CPT

## 2022-01-04 RX ADMIN — HYDROCODONE BITARTRATE AND ACETAMINOPHEN SCH TAB: 10; 325 TABLET ORAL at 17:20

## 2022-01-04 RX ADMIN — HYDROCODONE BITARTRATE AND ACETAMINOPHEN SCH TAB: 10; 325 TABLET ORAL at 12:48

## 2022-01-04 RX ADMIN — ASPIRIN SCH: 81 TABLET ORAL at 12:42

## 2022-01-04 RX ADMIN — Medication SCH MLS: at 14:42

## 2022-01-04 RX ADMIN — HYDROCODONE BITARTRATE AND ACETAMINOPHEN SCH TAB: 10; 325 TABLET ORAL at 20:24

## 2022-01-04 RX ADMIN — Medication SCH MLS: at 20:24

## 2022-01-04 RX ADMIN — ASPIRIN SCH MG: 81 TABLET ORAL at 20:24

## 2022-01-05 LAB
HGB BLD-MCNC: 10.6 G/DL (ref 14–18)
MCH RBC QN AUTO: 30.7 PG (ref 27–31)
MCV RBC AUTO: 90 FL (ref 78–98)
PLATELET # BLD AUTO: 245 THOU/UL (ref 130–400)
RBC # BLD AUTO: 3.45 MILL/UL (ref 4.7–6.1)
WBC # BLD AUTO: 11.6 THOU/UL (ref 4.8–10.8)

## 2022-01-05 RX ADMIN — HYDROCODONE BITARTRATE AND ACETAMINOPHEN SCH TAB: 10; 325 TABLET ORAL at 13:11

## 2022-01-05 RX ADMIN — MULTIPLE VITAMINS W/ MINERALS TAB SCH TAB: TAB at 09:26

## 2022-01-05 RX ADMIN — DOCUSATE SODIUM 50 MG AND SENNOSIDES 8.6 MG SCH TAB: 8.6; 5 TABLET, FILM COATED ORAL at 21:31

## 2022-01-05 RX ADMIN — HYDROCODONE BITARTRATE AND ACETAMINOPHEN SCH TAB: 10; 325 TABLET ORAL at 17:19

## 2022-01-05 RX ADMIN — HYDROCODONE BITARTRATE AND ACETAMINOPHEN SCH TAB: 10; 325 TABLET ORAL at 00:28

## 2022-01-05 RX ADMIN — HYDROCODONE BITARTRATE AND ACETAMINOPHEN SCH TAB: 10; 325 TABLET ORAL at 09:30

## 2022-01-05 RX ADMIN — HYDROCODONE BITARTRATE AND ACETAMINOPHEN SCH TAB: 10; 325 TABLET ORAL at 05:17

## 2022-01-05 RX ADMIN — ASPIRIN SCH MG: 81 TABLET ORAL at 09:24

## 2022-01-05 RX ADMIN — DOCUSATE SODIUM 50 MG AND SENNOSIDES 8.6 MG SCH TAB: 8.6; 5 TABLET, FILM COATED ORAL at 09:23

## 2022-01-06 LAB
HGB BLD-MCNC: 10.8 G/DL (ref 14–18)
MCH RBC QN AUTO: 30.5 PG (ref 27–31)
MCV RBC AUTO: 90.8 FL (ref 78–98)
PLATELET # BLD AUTO: 248 THOU/UL (ref 130–400)
RBC # BLD AUTO: 3.53 MILL/UL (ref 4.7–6.1)
WBC # BLD AUTO: 9.7 THOU/UL (ref 4.8–10.8)

## 2022-01-06 RX ADMIN — DOCUSATE SODIUM 50 MG AND SENNOSIDES 8.6 MG SCH TAB: 8.6; 5 TABLET, FILM COATED ORAL at 10:02

## 2022-01-06 RX ADMIN — MULTIPLE VITAMINS W/ MINERALS TAB SCH TAB: TAB at 10:03

## 2022-01-06 RX ADMIN — DOCUSATE SODIUM 50 MG AND SENNOSIDES 8.6 MG SCH TAB: 8.6; 5 TABLET, FILM COATED ORAL at 21:43

## 2022-01-07 VITALS — TEMPERATURE: 98.8 F | DIASTOLIC BLOOD PRESSURE: 82 MMHG | SYSTOLIC BLOOD PRESSURE: 121 MMHG

## 2022-01-07 LAB
HGB BLD-MCNC: 11 G/DL (ref 14–18)
MCH RBC QN AUTO: 30.5 PG (ref 27–31)
MCV RBC AUTO: 90.3 FL (ref 78–98)
PLATELET # BLD AUTO: 258 THOU/UL (ref 130–400)
RBC # BLD AUTO: 3.6 MILL/UL (ref 4.7–6.1)
WBC # BLD AUTO: 10.7 THOU/UL (ref 4.8–10.8)

## 2022-01-07 RX ADMIN — DOCUSATE SODIUM 50 MG AND SENNOSIDES 8.6 MG SCH TAB: 8.6; 5 TABLET, FILM COATED ORAL at 08:47

## 2022-01-07 RX ADMIN — HYDROCODONE BITARTRATE AND ACETAMINOPHEN PRN TAB: 10; 325 TABLET ORAL at 13:18

## 2022-01-07 RX ADMIN — MULTIPLE VITAMINS W/ MINERALS TAB SCH TAB: TAB at 08:46

## 2022-01-07 RX ADMIN — HYDROCODONE BITARTRATE AND ACETAMINOPHEN PRN TAB: 10; 325 TABLET ORAL at 08:45
